# Patient Record
Sex: FEMALE | Race: BLACK OR AFRICAN AMERICAN | URBAN - METROPOLITAN AREA
[De-identification: names, ages, dates, MRNs, and addresses within clinical notes are randomized per-mention and may not be internally consistent; named-entity substitution may affect disease eponyms.]

---

## 2024-12-06 ENCOUNTER — ANCILLARY PROCEDURE (OUTPATIENT)
Dept: URGENT CARE | Age: 69
End: 2024-12-06
Payer: MEDICARE

## 2024-12-06 ENCOUNTER — OFFICE VISIT (OUTPATIENT)
Dept: URGENT CARE | Age: 69
End: 2024-12-06
Payer: MEDICARE

## 2024-12-06 VITALS
OXYGEN SATURATION: 95 % | SYSTOLIC BLOOD PRESSURE: 177 MMHG | RESPIRATION RATE: 18 BRPM | HEART RATE: 60 BPM | TEMPERATURE: 98.4 F | DIASTOLIC BLOOD PRESSURE: 81 MMHG

## 2024-12-06 DIAGNOSIS — S99.922A FOOT INJURY, LEFT, INITIAL ENCOUNTER: ICD-10-CM

## 2024-12-06 PROCEDURE — 73630 X-RAY EXAM OF FOOT: CPT | Mod: LEFT SIDE

## 2024-12-06 ASSESSMENT — ENCOUNTER SYMPTOMS: ARTHRALGIAS: 1

## 2024-12-06 NOTE — PATIENT INSTRUCTIONS
You were seen at Urgent Care today for left foot pain.  X-rays negative for fracture or dislocation.  An Ace wrap was applied and Ace wrap was applied please treat as discussed. Please take medications as prescribed. Monitor for red flags which we spoke about, If your symptoms change, worsen or become concerning in any way, please go to the emergency room immediately, otherwise you can followup with your PCP in 2-3 days as needed

## 2024-12-06 NOTE — PROGRESS NOTES
Subjective   Patient ID: Felicia Muñoz is a 69 y.o. female. They present today with a chief complaint of Foot Injury (Twisted left foot 11/28. Swollen but no pain. ).    History of Present Illness  Patient is a 69-year-old female with no reported past medical history presents urgent care today with complaint of left foot pain.  She states she accidentally rolled her foot 8 days ago when attempting to get into her car.  She has been using over-the-counter medication, rest, ice and elevation without significant relief.  She is able to bear weight but with discomfort.  Denies any numbness, tingling or weakness.  She is not on blood thinners.  No other complaints or concerns mention at this time      History provided by:  Patient  Foot Injury         Past Medical History  Allergies as of 12/06/2024    (No Known Allergies)       (Not in a hospital admission)         No past medical history on file.    No past surgical history on file.     reports that she has never smoked. She has never used smokeless tobacco. She reports that she does not drink alcohol.    Review of Systems  Review of Systems   Musculoskeletal:  Positive for arthralgias.                                  Objective    Vitals:    12/06/24 0951   BP: 177/81   Pulse: 60   Resp: 18   Temp: 36.9 °C (98.4 °F)   SpO2: 95%     No LMP recorded.    Physical Exam  Vitals and nursing note reviewed.   Constitutional:       General: She is not in acute distress.     Appearance: Normal appearance. She is not ill-appearing, toxic-appearing or diaphoretic.   HENT:      Head: Normocephalic and atraumatic.      Mouth/Throat:      Mouth: Mucous membranes are moist.   Eyes:      Extraocular Movements: Extraocular movements intact.      Conjunctiva/sclera: Conjunctivae normal.      Pupils: Pupils are equal, round, and reactive to light.   Cardiovascular:      Rate and Rhythm: Normal rate and regular rhythm.      Pulses: Normal pulses.      Heart sounds: Normal heart sounds.    Pulmonary:      Effort: Pulmonary effort is normal. No respiratory distress.      Breath sounds: Normal breath sounds. No stridor. No wheezing, rhonchi or rales.   Chest:      Chest wall: No tenderness.   Musculoskeletal:         General: Swelling, tenderness and signs of injury present. No deformity. Normal range of motion.      Cervical back: Normal range of motion and neck supple.      Left lower leg: Edema present.        Feet:    Feet:      Comments: Pain with palpation of the lateral aspect of the left foot as depicted above.  Diffuse edema throughout ankle and foot.  No obvious deformity or dislocation.  No appreciable ecchymosis.  Normal skin color and temperature.  Pedal pulse strong and regular.  Normal capillary refill.  Skin:     General: Skin is warm and dry.      Capillary Refill: Capillary refill takes less than 2 seconds.   Neurological:      General: No focal deficit present.      Mental Status: She is alert and oriented to person, place, and time.   Psychiatric:         Mood and Affect: Mood normal.         Behavior: Behavior normal.         Procedures      Assessment/Plan   Allergies, medications, history, and pertinent labs/EKGs/Imaging reviewed by NETO Hendricks.     Medical Decision Making    Patient is well appearing, afebrile, non toxic, not hypoxic, and appropriate for outpatient treatment and management at time of evaluation. Patient presents with left foot pain x 8 days as described above.     Differential includes but not limited to: Fracture, sprain, strain, other    On exam, patient has lateral left foot tenderness without obvious deformity dislocation.  There is a significant amount of edema of the lower leg, ankle and foot which patient states is new since the injury.  She denies any calf pain.  Examination of the calf is unremarkable with negative Homans' sign, increased warmth or pain on palpation.  X-ray independently reviewed myself and interpreted as no acute osseous  abnormality.  Suspect lymphadenopathy is secondary to sprain/injury of foot.  Recommended compression bandage/socks and continued use of over-the-counter pain medication, rest and elevation as well as close follow-up with PCP.  An ace wrap was applied.  Patient neurologically intact after application.  Patient is agreement this plan.  ER precautions discussed.  She was discharged in stable condition.  All questions and concerns addressed.               Orders and Diagnoses  There are no diagnoses linked to this encounter.    Medical Admin Record  === 12/06/24 ===    XR FOOT 3+ VIEWS LEFT    - Impression -  Mild degenerative changes. Calcaneal spur. No fracture or subluxation    Signed by: Alexandro Perkins 12/6/2024 10:20 AM  Dictation workstation:   [id number]      Follow Up Instructions  No follow-ups on file.    Patient disposition: Home    Electronically signed by NETO Hendricks  9:57 AM